# Patient Record
Sex: MALE | Race: OTHER | NOT HISPANIC OR LATINO
[De-identification: names, ages, dates, MRNs, and addresses within clinical notes are randomized per-mention and may not be internally consistent; named-entity substitution may affect disease eponyms.]

---

## 2020-02-11 ENCOUNTER — APPOINTMENT (OUTPATIENT)
Dept: UROLOGY | Facility: CLINIC | Age: 60
End: 2020-02-11
Payer: COMMERCIAL

## 2020-02-11 VITALS
HEIGHT: 67 IN | DIASTOLIC BLOOD PRESSURE: 81 MMHG | BODY MASS INDEX: 21.19 KG/M2 | HEART RATE: 74 BPM | WEIGHT: 135 LBS | SYSTOLIC BLOOD PRESSURE: 138 MMHG | RESPIRATION RATE: 16 BRPM

## 2020-02-11 PROCEDURE — 99204 OFFICE O/P NEW MOD 45 MIN: CPT

## 2020-02-11 NOTE — LETTER BODY
[FreeTextEntry1] : Dear Dr. Caraballo,\par \par Thank you for referring your patient Mike Kay for consultation for erectile dysfunction. I have requested several baseline blood studies. I will see the patient back in followup shortly and make further recommendations. I have attached a copy of my consultation note for your records.\par \par Thank you again for this kind referral. I will certainly keep you updated with further progress. Please do not hesitate to call me if you have any questions.\par \par Best regards,\par \par \par \par Britton Daniel M.D., PhD\par Professor of Urology\par    [employer]\par Director, Reproductive and Sexual Medicine\par    [facility]\par    [facility]\par

## 2020-02-11 NOTE — ASSESSMENT
[FreeTextEntry1] : This pleasant  gentleman presents for evaluation of his sexual dysfunction. I have requested several blood studies.  Urine dip and microscopic analysis was requested. I have suggested he is trying Tadalafil for 2-3 weeks to see if it improves his erectile function.  I will be better able to make more specific recommendations after additional test results return. \par 1. Review blood tests on follow up\par

## 2020-02-11 NOTE — PHYSICAL EXAM
[General Appearance - Well Developed] : well developed [General Appearance - Well Nourished] : well nourished [Normal Appearance] : normal appearance [Well Groomed] : well groomed [General Appearance - In No Acute Distress] : no acute distress [Heart Rate And Rhythm] : Heart rate and rhythm were normal [Edema] : no peripheral edema [Arterial Pulses Normal] : the pedal pulses were normal [Respiration, Rhythm And Depth] : normal respiratory rhythm and effort [Exaggerated Use Of Accessory Muscles For Inspiration] : no accessory muscle use [Auscultation Breath Sounds / Voice Sounds] : lungs were clear to auscultation bilaterally [Chest Palpation] : palpation of the chest revealed no abnormalities [Lungs Percussion] : the lungs were normal to percussion [Abdomen Soft] : soft [Bowel Sounds] : normal bowel sounds [Abdomen Tenderness] : non-tender [Abdomen Hernia] : no hernia was discovered [Abdomen Mass (___ Cm)] : no abdominal mass palpated [Urinary Bladder Findings] : the bladder was normal on palpation [Urethral Meatus] : meatus normal [Costovertebral Angle Tenderness] : no ~M costovertebral angle tenderness [Epididymis] : the epididymides were normal [Scrotum] : the scrotum was normal [Rectal Exam - Seminal Vesicles] : the seminal vesicles were normal [Anus Abnormality] : the anus and perineum were normal [Testes Tenderness] : no tenderness of the testes [Testes Mass (___cm)] : there were no testicular masses [Rectal Exam - Rectum] : digital rectal exam was normal [Prostate Tenderness] : the prostate was not tender [Prostate Enlargement] : the prostate was not enlarged [No Prostate Nodules] : no prostate nodules [Skin Color & Pigmentation] : normal skin color and pigmentation [Normal Station and Gait] : the gait and station were normal for the patient's age [Skin Lesions] : no skin lesions [Skin Turgor] : supple [] : no rash [Sensation] : the sensory exam was normal to light touch and pinprick [No Focal Deficits] : no focal deficits [Oriented To Time, Place, And Person] : oriented to person, place, and time [Motor Exam] : the motor exam was normal [Affect] : the affect was normal [Not Anxious] : not anxious [Mood] : the mood was normal [No Palpable Adenopathy] : no palpable adenopathy [Cervical Lymph Nodes Enlarged Posterior Bilaterally] : posterior cervical [Supraclavicular Lymph Nodes Enlarged Bilaterally] : supraclavicular [Cervical Lymph Nodes Enlarged Anterior Bilaterally] : anterior cervical [Inguinal Lymph Nodes Enlarged Bilaterally] : inguinal [Axillary Lymph Nodes Enlarged Bilaterally] : axillary [Femoral Lymph Nodes Enlarged Bilaterally] : femoral

## 2020-02-18 LAB
25(OH)D3 SERPL-MCNC: 17.3 NG/ML
ALBUMIN SERPL ELPH-MCNC: 4.4 G/DL
ALP BLD-CCNC: 90 U/L
ALT SERPL-CCNC: 15 U/L
ANION GAP SERPL CALC-SCNC: 17 MMOL/L
AST SERPL-CCNC: 17 U/L
BASOPHILS # BLD AUTO: 0.04 K/UL
BASOPHILS NFR BLD AUTO: 0.5 %
BILIRUB SERPL-MCNC: 0.3 MG/DL
BUN SERPL-MCNC: 17 MG/DL
CALCIUM SERPL-MCNC: 9.6 MG/DL
CHLORIDE SERPL-SCNC: 103 MMOL/L
CHOLEST SERPL-MCNC: 242 MG/DL
CHOLEST/HDLC SERPL: 5.2 RATIO
CO2 SERPL-SCNC: 22 MMOL/L
CREAT SERPL-MCNC: 1.01 MG/DL
EOSINOPHIL # BLD AUTO: 0.08 K/UL
EOSINOPHIL NFR BLD AUTO: 0.9 %
ESTIMATED AVERAGE GLUCOSE: 123 MG/DL
ESTRADIOL SERPL-MCNC: 30 PG/ML
FSH SERPL-MCNC: 1.9 IU/L
GLUCOSE SERPL-MCNC: 86 MG/DL
HBA1C MFR BLD HPLC: 5.9 %
HCT VFR BLD CALC: 50 %
HDLC SERPL-MCNC: 47 MG/DL
HGB BLD-MCNC: 16.1 G/DL
IMM GRANULOCYTES NFR BLD AUTO: 0.2 %
LDLC SERPL CALC-MCNC: 165 MG/DL
LYMPHOCYTES # BLD AUTO: 3.01 K/UL
LYMPHOCYTES NFR BLD AUTO: 35.5 %
MAN DIFF?: NORMAL
MCHC RBC-ENTMCNC: 28.8 PG
MCHC RBC-ENTMCNC: 32.2 GM/DL
MCV RBC AUTO: 89.4 FL
MONOCYTES # BLD AUTO: 0.57 K/UL
MONOCYTES NFR BLD AUTO: 6.7 %
NEUTROPHILS # BLD AUTO: 4.77 K/UL
NEUTROPHILS NFR BLD AUTO: 56.2 %
PLATELET # BLD AUTO: 247 K/UL
POTASSIUM SERPL-SCNC: 4.4 MMOL/L
PROT SERPL-MCNC: 7.4 G/DL
PSA SERPL-MCNC: 0.6 NG/ML
RBC # BLD: 5.59 M/UL
RBC # FLD: 13.2 %
SODIUM SERPL-SCNC: 142 MMOL/L
TESTOST BND SERPL-MCNC: 6.1 PG/ML
TESTOST SERPL-MCNC: 344.5 NG/DL
TRIGL SERPL-MCNC: 151 MG/DL
TSH SERPL-ACNC: 2.06 UIU/ML
WBC # FLD AUTO: 8.49 K/UL

## 2020-02-25 ENCOUNTER — APPOINTMENT (OUTPATIENT)
Dept: UROLOGY | Facility: CLINIC | Age: 60
End: 2020-02-25
Payer: COMMERCIAL

## 2020-02-25 PROCEDURE — 99214 OFFICE O/P EST MOD 30 MIN: CPT

## 2020-02-25 NOTE — HISTORY OF PRESENT ILLNESS
[FreeTextEntry1] : The patient returns for followup to review his recent blood studies and discuss options for therapy. Tadalafil has been working very well for him.\par \par PMH: Patient initially presented with a chief complaint of erectile dysfunction for the past 2 months.  We reviewed the questionnaire he completed in detail. Patient states sometimes his erections are completely normal especially with masturbation. However, sometimes with his wife is not. His erections are not modified with the degree of sexual stimulation.   He, at times,  has difficulty maintaining an erection. He describes a normal libido.  His sexual dysfunction occurs with both sexual relations and masturbation.  His erections are not improved with PDE5 inhibitors.   His partner is understanding and was unable to be with him at the visit today. He is not  and has adult children.  \par \par His past medical history is non-contributory.  In his present occupation he has no known toxin exposure. He does not smoke and drinks socially .  He has no known drug allergies. His review of systems and past medical history demonstrates no significant urologic issues (see patient completed questionnaire). His family history demonstrates no significant urologic issues.

## 2020-02-25 NOTE — ASSESSMENT
[FreeTextEntry1] : The patient returns for followup to review his recent blood studies and discuss options for therapy. Tadalafil has been working very well for him. He will continue with that medication. Blood test demonstrated prediabetes and hyperlipidemia. Diet and exercise was discussed.

## 2020-02-25 NOTE — PHYSICAL EXAM
[General Appearance - Well Developed] : well developed [General Appearance - Well Nourished] : well nourished [Normal Appearance] : normal appearance [Well Groomed] : well groomed [Abdomen Soft] : soft [General Appearance - In No Acute Distress] : no acute distress [Urinary Bladder Findings] : the bladder was normal on palpation [Urethral Meatus] : meatus normal [Testes Mass (___cm)] : there were no testicular masses [Scrotum] : the scrotum was normal [Skin Turgor] : supple [Affect] : the affect was normal [Oriented To Time, Place, And Person] : oriented to person, place, and time [Mood] : the mood was normal [Normal Station and Gait] : the gait and station were normal for the patient's age [Not Anxious] : not anxious

## 2020-03-05 ENCOUNTER — APPOINTMENT (OUTPATIENT)
Dept: ORTHOPEDIC SURGERY | Facility: CLINIC | Age: 60
End: 2020-03-05

## 2020-05-04 ENCOUNTER — APPOINTMENT (OUTPATIENT)
Dept: DISASTER EMERGENCY | Facility: CLINIC | Age: 60
End: 2020-05-04

## 2020-05-05 LAB
SARS-COV-2 IGG SERPL IA-ACNC: 0.4 INDEX
SARS-COV-2 IGG SERPL QL IA: NEGATIVE

## 2020-11-12 ENCOUNTER — APPOINTMENT (OUTPATIENT)
Dept: UROLOGY | Facility: CLINIC | Age: 60
End: 2020-11-12

## 2021-04-29 ENCOUNTER — RX RENEWAL (OUTPATIENT)
Age: 61
End: 2021-04-29

## 2021-05-24 ENCOUNTER — APPOINTMENT (OUTPATIENT)
Dept: UROLOGY | Facility: CLINIC | Age: 61
End: 2021-05-24
Payer: COMMERCIAL

## 2021-05-24 PROCEDURE — 99214 OFFICE O/P EST MOD 30 MIN: CPT | Mod: 95

## 2021-05-24 NOTE — HISTORY OF PRESENT ILLNESS
[FreeTextEntry1] : The patient-doctor. relationship has been established in a face-to-face fashion on real-time video audio HIPAA compliant communication using telemedicine software.  The patient's identity has been confirmed.  The patient was previously emailed a copy of the telemedicine consent.  The patient has had a chance to review and has now given verbal consent and has requested care to be assessed and treated through telemedicine. The patient understands there may be limitations in this process and that they need not need further follow-up care in the office and/or hospital settings. We were unable to use the [employer] platform and an alternative platform was utilized.  The patient was at home and I was in the office.\par \par Verbal consent was given on Monday, May 2 24th 2021 at 9:30 AM by the patient.  It was requested by the physician.  A written consent was previously sent for the patient to sign and return.\par \par The patient returns for follow-up to review his treatment.  Tadalafil has been working very well for him.  He has not had blood studies in over a year.\par \par PMH: Patient initially presented with a chief complaint of erectile dysfunction for the past 2 months.  We reviewed the questionnaire he completed in detail. Patient states sometimes his erections are completely normal especially with masturbation. However, sometimes with his wife is not. His erections are not modified with the degree of sexual stimulation.   He, at times,  has difficulty maintaining an erection. He describes a normal libido.  His sexual dysfunction occurs with both sexual relations and masturbation.  His erections are not improved with PDE5 inhibitors.   His partner is understanding and was unable to be with him at the visit today. He is not  and has adult children.  \par \par His past medical history is non-contributory.  In his present occupation he has no known toxin exposure. He does not smoke and drinks socially .  He has no known drug allergies. His review of systems and past medical history demonstrates no significant urologic issues (see patient completed questionnaire). His family history demonstrates no significant urologic issues.

## 2021-05-24 NOTE — ASSESSMENT
[FreeTextEntry1] : The patient returns for follow-up to review his treatment.  Tadalafil has been working very well for him.  He has not had blood studies in over a year.  We reviewed the proper use of tadalafil with the relative risks and benefits.  I have also requested some blood studies and will review the results with him in 2 weeks.  I refilled his tadalafil today.\par \par Telehealth Consultation: 30 minutes  10 minutes reviewing his history and discussing prior results.  20 minutes discussing various treatment options, writing medication prescriptions, requests for lab testing and writing his note. There was also additional time in preparing for the visit and assisting the patient with technology issues he was having with the telehealth platform.

## 2023-11-21 ENCOUNTER — APPOINTMENT (OUTPATIENT)
Dept: UROLOGY | Facility: CLINIC | Age: 63
End: 2023-11-21
Payer: COMMERCIAL

## 2023-11-21 DIAGNOSIS — N52.9 MALE ERECTILE DYSFUNCTION, UNSPECIFIED: ICD-10-CM

## 2023-11-21 PROCEDURE — 99214 OFFICE O/P EST MOD 30 MIN: CPT | Mod: 95

## 2023-11-21 RX ORDER — TADALAFIL 10 MG/1
10 TABLET, FILM COATED ORAL
Qty: 20 | Refills: 0 | Status: ACTIVE | COMMUNITY
Start: 2020-02-11 | End: 1900-01-01

## 2024-04-01 NOTE — HISTORY OF PRESENT ILLNESS
Please add on at 2:30pm today and let pt know. Okay to double book. Thank you.   [FreeTextEntry1] : Patient is a 59-year-old gentleman who presents with a chief complaint of erectile dysfunction for the past 2 months.  We reviewed the questionnaire he completed in detail. Patient states sometimes his erections are completely normal especially with masturbation. However, sometimes with his wife is not. His erections are not modified with the degree of sexual stimulation.   He, at times,  has difficulty maintaining an erection. He describes a normal libido.  His sexual dysfunction occurs with both sexual relations and masturbation.  His erections are not improved with PDE5 inhibitors.   His partner is understanding and was unable to be with him at the visit today. He is not  and has adult children.  \par \par His past medical history is non-contributory.  In his present occupation he has no known toxin exposure. He does not smoke and drinks socially .  He has no known drug allergies. His review of systems and past medical history demonstrates no significant urologic issues (see patient completed questionnaire). His family history demonstrates no significant urologic issues.